# Patient Record
Sex: FEMALE | Race: WHITE
[De-identification: names, ages, dates, MRNs, and addresses within clinical notes are randomized per-mention and may not be internally consistent; named-entity substitution may affect disease eponyms.]

---

## 2018-01-24 ENCOUNTER — HOSPITAL ENCOUNTER (EMERGENCY)
Dept: HOSPITAL 62 - ER | Age: 28
Discharge: STILL ADMITTED OR ADMINISTRATIVE | End: 2018-01-24
Payer: COMMERCIAL

## 2018-01-24 ENCOUNTER — HOSPITAL ENCOUNTER (OUTPATIENT)
Dept: HOSPITAL 62 - LC | Age: 28
LOS: 1 days | Discharge: HOME | End: 2018-01-25
Attending: OBSTETRICS & GYNECOLOGY
Payer: MEDICAID

## 2018-01-24 VITALS — DIASTOLIC BLOOD PRESSURE: 74 MMHG | SYSTOLIC BLOOD PRESSURE: 111 MMHG

## 2018-01-24 DIAGNOSIS — O26.893: ICD-10-CM

## 2018-01-24 DIAGNOSIS — O46.93: ICD-10-CM

## 2018-01-24 DIAGNOSIS — F11.23: ICD-10-CM

## 2018-01-24 DIAGNOSIS — F13.239: ICD-10-CM

## 2018-01-24 DIAGNOSIS — O22.43: ICD-10-CM

## 2018-01-24 DIAGNOSIS — Z3A.30: ICD-10-CM

## 2018-01-24 DIAGNOSIS — Z3A.28: ICD-10-CM

## 2018-01-24 DIAGNOSIS — O99.333: ICD-10-CM

## 2018-01-24 DIAGNOSIS — O47.03: Primary | ICD-10-CM

## 2018-01-24 DIAGNOSIS — O21.2: ICD-10-CM

## 2018-01-24 DIAGNOSIS — O99.323: Primary | ICD-10-CM

## 2018-01-24 DIAGNOSIS — F17.210: ICD-10-CM

## 2018-01-24 LAB
ADD MANUAL DIFF: NO
ALBUMIN SERPL-MCNC: 3.7 G/DL (ref 3.5–5)
ALP SERPL-CCNC: 188 U/L (ref 38–126)
ALT SERPL-CCNC: 46 U/L (ref 9–52)
ANION GAP SERPL CALC-SCNC: 16 MMOL/L (ref 5–19)
APAP SERPL-MCNC: < 10 UG/ML (ref 10–30)
APPEARANCE UR: (no result)
APTT PPP: (no result) S
AST SERPL-CCNC: 43 U/L (ref 14–36)
BARBITURATES UR QL SCN: NEGATIVE
BASOPHILS # BLD AUTO: 0 10^3/UL (ref 0–0.2)
BASOPHILS NFR BLD AUTO: 0 % (ref 0–2)
BILIRUB DIRECT SERPL-MCNC: 0.5 MG/DL (ref 0–0.4)
BILIRUB SERPL-MCNC: 0.8 MG/DL (ref 0.2–1.3)
BILIRUB UR QL STRIP: (no result)
BUN SERPL-MCNC: 8 MG/DL (ref 7–20)
CALCIUM: 9.9 MG/DL (ref 8.4–10.2)
CHLORIDE SERPL-SCNC: 108 MMOL/L (ref 98–107)
CO2 SERPL-SCNC: 16 MMOL/L (ref 22–30)
EOSINOPHIL # BLD AUTO: 0 10^3/UL (ref 0–0.6)
EOSINOPHIL NFR BLD AUTO: 0 % (ref 0–6)
ERYTHROCYTE [DISTWIDTH] IN BLOOD BY AUTOMATED COUNT: 12.8 % (ref 11.5–14)
ETHANOL SERPL-MCNC: < 10 MG/DL
GLUCOSE SERPL-MCNC: 104 MG/DL (ref 75–110)
GLUCOSE UR STRIP-MCNC: NEGATIVE MG/DL
HCT VFR BLD CALC: 34.1 % (ref 36–47)
HGB BLD-MCNC: 11.7 G/DL (ref 12–15.5)
KETONES UR STRIP-MCNC: 80 MG/DL
LDH1 SERPL-CCNC: 449 U/L (ref 313–618)
LYMPHOCYTES # BLD AUTO: 1 10^3/UL (ref 0.5–4.7)
LYMPHOCYTES NFR BLD AUTO: 10.2 % (ref 13–45)
MCH RBC QN AUTO: 29.6 PG (ref 27–33.4)
MCHC RBC AUTO-ENTMCNC: 34.2 G/DL (ref 32–36)
MCV RBC AUTO: 87 FL (ref 80–97)
METHADONE UR QL SCN: NEGATIVE
MONOCYTES # BLD AUTO: 0.3 10^3/UL (ref 0.1–1.4)
MONOCYTES NFR BLD AUTO: 3.3 % (ref 3–13)
NEUTROPHILS # BLD AUTO: 8.9 10^3/UL (ref 1.7–8.2)
NEUTS SEG NFR BLD AUTO: 86.5 % (ref 42–78)
NITRITE UR QL STRIP: NEGATIVE
PCP UR QL SCN: NEGATIVE
PH UR STRIP: 6 [PH] (ref 5–9)
PLATELET # BLD: 334 10^3/UL (ref 150–450)
POTASSIUM SERPL-SCNC: 3.7 MMOL/L (ref 3.6–5)
PROT SERPL-MCNC: 7.5 G/DL (ref 6.3–8.2)
PROT UR STRIP-MCNC: 100 MG/DL
RBC # BLD AUTO: 3.94 10^6/UL (ref 3.72–5.28)
SALICYLATES SERPL-MCNC: < 1 MG/DL (ref 2–20)
SODIUM SERPL-SCNC: 139.9 MMOL/L (ref 137–145)
SP GR UR STRIP: 1.03
TOTAL CELLS COUNTED % (AUTO): 100 %
URINE AMPHETAMINES SCREEN: NEGATIVE
URINE BENZODIAZEPINES SCREEN: NEGATIVE
URINE COCAINE SCREEN: NEGATIVE
URINE MARIJUANA (THC) SCREEN: NEGATIVE
UROBILINOGEN UR-MCNC: 2 MG/DL (ref ?–2)
WBC # BLD AUTO: 10.3 10^3/UL (ref 4–10.5)

## 2018-01-24 PROCEDURE — 76815 OB US LIMITED FETUS(S): CPT

## 2018-01-24 PROCEDURE — 93005 ELECTROCARDIOGRAM TRACING: CPT

## 2018-01-24 PROCEDURE — 80307 DRUG TEST PRSMV CHEM ANLYZR: CPT

## 2018-01-24 PROCEDURE — 85384 FIBRINOGEN ACTIVITY: CPT

## 2018-01-24 PROCEDURE — 83615 LACTATE (LD) (LDH) ENZYME: CPT

## 2018-01-24 PROCEDURE — 4A1HXCZ MONITORING OF PRODUCTS OF CONCEPTION, CARDIAC RATE, EXTERNAL APPROACH: ICD-10-PCS | Performed by: OBSTETRICS & GYNECOLOGY

## 2018-01-24 PROCEDURE — 96374 THER/PROPH/DIAG INJ IV PUSH: CPT

## 2018-01-24 PROCEDURE — 82272 OCCULT BLD FECES 1-3 TESTS: CPT

## 2018-01-24 PROCEDURE — 96361 HYDRATE IV INFUSION ADD-ON: CPT

## 2018-01-24 PROCEDURE — 93010 ELECTROCARDIOGRAM REPORT: CPT

## 2018-01-24 PROCEDURE — 84703 CHORIONIC GONADOTROPIN ASSAY: CPT

## 2018-01-24 PROCEDURE — 96375 TX/PRO/DX INJ NEW DRUG ADDON: CPT

## 2018-01-24 PROCEDURE — 85362 FIBRIN DEGRADATION PRODUCTS: CPT

## 2018-01-24 PROCEDURE — 81001 URINALYSIS AUTO W/SCOPE: CPT

## 2018-01-24 PROCEDURE — 85025 COMPLETE CBC W/AUTO DIFF WBC: CPT

## 2018-01-24 PROCEDURE — 36415 COLL VENOUS BLD VENIPUNCTURE: CPT

## 2018-01-24 PROCEDURE — 80053 COMPREHEN METABOLIC PANEL: CPT

## 2018-01-24 PROCEDURE — 99291 CRITICAL CARE FIRST HOUR: CPT

## 2018-01-24 NOTE — RADIOLOGY REPORT (SQ)
EXAM DESCRIPTION:  U/S OB LIMITED



COMPLETED DATE/TIME:  1/24/2018 7:24 pm



REASON FOR STUDY:  7 months, blleding, opiate withdrawal



COMPARISON:  None.



TECHNIQUE:  Limited transabdominal grayscale ultrasound for evaluation of specific requested obstetri
afshin parameters.



LIMITATIONS:  None.



FINDINGS:  CERVICAL LENGTH: Not measured   Closed.

NIKOLAY: 14.0 cm.

FHR: 155 beats per minute.

PRESENTATION: Cephalic.

OTHER: Posterior placenta



IMPRESSION:  LIMITED OBSTETRICAL ULTRASOUND WITH MEASURED PARAMETERS DELINEATED ABOVE.

Trimester of pregnancy: Third trimester - 28 weeks to delivery.



TECHNICAL DOCUMENTATION:  JOB ID:  [id number]

[id number]

 2011 [employer]- All Rights Reserved

## 2018-01-24 NOTE — ER DOCUMENT REPORT
ED General





- General


Chief Complaint: Drug Abuse


Stated Complaint: DETOX


Time Seen by Provider: 18 18:08


Mode of Arrival: Ambulatory


Information source: Patient


Notes: 





This is a 27-year-old female  3 para 1, 7 months pregnant (has had an 

ultrasound early in the pregnancy at the health department at [place]), admits 

to active heroin and benzodiazepine abuse.  Patient was incarcerated last night 

and shortly thereafter started going into the withdrawal.  The patient presents 

to the emergency room with nausea, vomiting, abdominal cramping.  She does 

state that she has had some blood in the stool.





- HPI


Onset: Just prior to arrival


Onset/Duration: Gradual


Quality of pain: Cramping


Severity: Mild


Pain Level: 1


Associated symptoms: Chills, Nausea, Vomiting.  denies: Shortness of breath


Exacerbated by: Denies


Relieved by: Denies


Similar symptoms previously: No


Recently seen / treated by doctor: No





- Related Data


Allergies/Adverse Reactions: 


 





No Known Allergies Allergy (Verified 18 23:06)


 











Past Medical History





- General


Information source: Patient





- Social History


Smoking Status: Current Every Day Smoker


Cigarette use (# per day): Yes - Half pack per day


Chew tobacco use (# tins/day): No


Frequency of alcohol use: None


Drug Abuse: Heroin, Prescription drugs


Lives with: Family


Family History: None


Patient has suicidal ideation: No


Patient has homicidal ideation: No





- Medical History


Medical History: Negative


Past Surgical History: Reports: Hx Orthopedic Surgery





Review of Systems





- Review of Systems


Constitutional: denies: Chills, Fever


EENT: No symptoms reported


Cardiovascular: No symptoms reported


Respiratory: No symptoms reported


Gastrointestinal: See HPI


Genitourinary: No symptoms reported


Female Genitourinary: No symptoms reported


Musculoskeletal: No symptoms reported


Skin: No symptoms reported


Hematologic/Lymphatic: No symptoms reported


Neurological/Psychological: No symptoms reported





Physical Exam





- Vital signs


Vitals: 


 











Resp Pulse Ox


 


 21 H  100 


 


 18 17:55  18 17:55











Notes: 





Physical exam:


 


GENERAL: 27-year-old female, actively vomiting





HEAD: Atraumatic, normocephalic.





EYES: Pupils equal round and reactive to light, extraocular movements intact, 

sclera anicteric, conjunctiva are normal.





ENT: TMs normal, nares patent, oropharynx clear without exudates.  Moist mucous 

membranes.





NECK: Normal range of motion, supple without obvious mass or JVD.





LUNGS: Breath sounds clear to auscultation bilaterally and equal.  No wheezes 

rales or rhonchi.





HEART: Regular rate and rhythm without murmurs, rubs or gallops.





ABDOMEN: Soft, assistant with 7 months pregnant, no evidence of peritoneal 

findings at this time.





EXTREMITIES: Normal range of motion, no pitting or edema.  No clubbing or 

cyanosis.





NEUROLOGICAL: Cranial nerves II through XII grossly intact.  Normal speech, 

moving all extremities.





PSYCH: Normal mood, normal affect.





SKIN: Warm, Dry, normal turgor, no rashes or lesions noted.





Course





- Re-evaluation


Re-evalutation: 





18 20:47


Note: The ultrasound shows a viable 28 week gestation pregnancy.  The patient 

has been treated with IV D5 normal saline, IV Reglan, IV Benadryl, IV Ativan.  

She does appear to be improved.  I did perform a perineal exam and rectal exam 

in the presence of a chaperone (Summer the nurse).  There is no blood obvious 

in the vaginal canal.  On rectal exam, she does have hemorrhoid.  There is no 

gross bleeding.  The tool is brown and sent for study.  I discussed the case 

with Dr. Hermosillo.  The concern is that when the patient goes back to nursing home, in 

active withdrawal, she may go into premature labor.  The plan is to have the 

patient seen in labor check and then admitted for continued IV fluids and 

treatment of active withdrawal.





- Vital Signs


Vital signs: 


 











Temp Pulse Resp BP Pulse Ox


 


       23 H  111/74   99 


 


       18 22:01  18 22:01  18 22:01














- Laboratory


Result Diagrams: 


 18 18:04





 18 18:04


Laboratory results interpreted by me: 


 











  18





  18:04 18:04 18:04


 


Hgb  11.7 L  


 


Hct  34.1 L  


 


Seg Neutrophils %  86.5 H  


 


Lymphocytes %  10.2 L  


 


Absolute Neutrophils  8.9 H  


 


Chloride   108 H 


 


Carbon Dioxide   16 L 


 


Direct Bilirubin   0.5 H 


 


AST   43 H 


 


Alkaline Phosphatase   188 H 


 


Serum HCG, Qual    POSITIVE H


 


Urine Protein   


 


Urine Ketones   


 


Urine Bilirubin   


 


Urine Urobilinogen   


 


Ur Leukocyte Esterase   


 


Salicylates   < 1.0 L 


 


Acetaminophen   < 10 L 














  18





  18:04


 


Hgb 


 


Hct 


 


Seg Neutrophils % 


 


Lymphocytes % 


 


Absolute Neutrophils 


 


Chloride 


 


Carbon Dioxide 


 


Direct Bilirubin 


 


AST 


 


Alkaline Phosphatase 


 


Serum HCG, Qual 


 


Urine Protein  100 H


 


Urine Ketones  80 H


 


Urine Bilirubin  SMALL H


 


Urine Urobilinogen  2.0 H


 


Ur Leukocyte Esterase  TRACE H


 


Salicylates 


 


Acetaminophen 














- Diagnostic Test


Radiology reviewed: Image reviewed, Reports reviewed - Ultrasound consistent 

with 28 week viable pregnancy





Critical Care Note





- Critical Care Note


Total time excluding time spent on procedures (mins): 60





Discharge





- Discharge


Clinical Impression: 


 Vomiting, Opiate and benzodiazepine withdrawal, 28 weeks pregnant





Condition: Stable


Disposition: LABOR CHECK

## 2018-01-25 NOTE — RADIOLOGY REPORT (SQ)
EXAM DESCRIPTION: U/S OB LIMITED



CLINICAL HISTORY: 27 years Female, cervical length, EGA for

vaginal bleeding/abd pain



COMPARISON: None.



TECHNIQUE: Limited transabdominal third trimester obstetrical

ultrasound performed. Fetal organ survey not performed.



FINDINGS: 



Cervical length of 3.2 cm.



 There is a single intrauterine pregnancy with a fetal heart rate

of 140 bpm



Fetal position: Vertex.



Fetal measurements:

BPD: 7.94 cm compatible with an estimated gestational age of 31

weeks, 6 days.

HC: 29.23 cm compatible with an estimated gestational age of 32

weeks, 2 days.

AC:25.17 cm compatible with an estimated gestational age of 29

weeks, 3 days.

FL: 5.64 cm compatible with an estimated gestational age of 29

weeks, 4 days.

Estimated fetal weight of 1476 g. 3 pounds, 4 ounces. 19th

percentile.



Ultrasound age average 30 weeks, 6 days.



IMPRESSION:



1. Single live intrauterine pregnancy with estimated gestational

age of 30 weeks, 6 days. Fetal heart rate of 140 bpm.

## 2018-01-25 NOTE — EKG REPORT
SEVERITY:- ABNORMAL ECG -

SINUS RHYTHM

NONSPECIFIC  T ABNORMALITIES, ANTERIOR LEADS

:

Confirmed by: Kristin Stokes 25-Jan-2018 12:02:33